# Patient Record
Sex: MALE | Race: WHITE | HISPANIC OR LATINO | Employment: FULL TIME | URBAN - METROPOLITAN AREA
[De-identification: names, ages, dates, MRNs, and addresses within clinical notes are randomized per-mention and may not be internally consistent; named-entity substitution may affect disease eponyms.]

---

## 2019-08-29 PROBLEM — S62.632B OPEN DISPLACED FRACTURE OF DISTAL PHALANX OF RIGHT MIDDLE FINGER: Status: ACTIVE | Noted: 2019-08-29

## 2019-08-29 PROBLEM — S62.634B OPEN DISPLACED FRACTURE OF DISTAL PHALANX OF RIGHT RING FINGER: Status: ACTIVE | Noted: 2019-08-29

## 2024-05-15 ENCOUNTER — HOSPITAL ENCOUNTER (EMERGENCY)
Facility: HOSPITAL | Age: 29
Discharge: HOME | End: 2024-05-15
Attending: EMERGENCY MEDICINE
Payer: OTHER MISCELLANEOUS

## 2024-05-15 VITALS
DIASTOLIC BLOOD PRESSURE: 85 MMHG | HEIGHT: 68 IN | OXYGEN SATURATION: 99 % | BODY MASS INDEX: 25.76 KG/M2 | HEART RATE: 68 BPM | WEIGHT: 170 LBS | RESPIRATION RATE: 18 BRPM | SYSTOLIC BLOOD PRESSURE: 127 MMHG | TEMPERATURE: 98 F

## 2024-05-15 DIAGNOSIS — R07.89 CHEST WALL PAIN: ICD-10-CM

## 2024-05-15 DIAGNOSIS — S29.9XXA TRAUMA OF CHEST, INITIAL ENCOUNTER: Primary | ICD-10-CM

## 2024-05-15 DIAGNOSIS — T14.90XA INJURY: ICD-10-CM

## 2024-05-15 PROCEDURE — 99284 EMERGENCY DEPT VISIT MOD MDM: CPT | Mod: 25

## 2024-05-15 PROCEDURE — 93010 ELECTROCARDIOGRAM REPORT: CPT | Mod: ,,, | Performed by: INTERNAL MEDICINE

## 2024-05-15 PROCEDURE — 93005 ELECTROCARDIOGRAM TRACING: CPT | Performed by: INTERNAL MEDICINE

## 2024-05-15 PROCEDURE — 25000003 PHARM REV CODE 250: Performed by: NURSE PRACTITIONER

## 2024-05-15 RX ORDER — METHOCARBAMOL 500 MG/1
500 TABLET, FILM COATED ORAL
Status: COMPLETED | OUTPATIENT
Start: 2024-05-15 | End: 2024-05-15

## 2024-05-15 RX ORDER — METHOCARBAMOL 750 MG/1
750 TABLET, FILM COATED ORAL 3 TIMES DAILY PRN
Qty: 15 TABLET | Refills: 0 | Status: SHIPPED | OUTPATIENT
Start: 2024-05-15 | End: 2024-05-20

## 2024-05-15 RX ORDER — IBUPROFEN 600 MG/1
600 TABLET ORAL EVERY 6 HOURS PRN
Qty: 20 TABLET | Refills: 0 | Status: SHIPPED | OUTPATIENT
Start: 2024-05-15 | End: 2024-05-20

## 2024-05-15 RX ADMIN — METHOCARBAMOL 500 MG: 500 TABLET ORAL at 04:05

## 2024-05-15 RX ADMIN — IBUPROFEN 600 MG: 400 TABLET ORAL at 04:05

## 2024-05-15 NOTE — FIRST PROVIDER EVALUATION
Emergency Department TeleTriage Encounter Note      CHIEF COMPLAINT    Chief Complaint   Patient presents with    Chest Injury     States machine at work hit him in the chest, seen at urgent care and sent for CT       VITAL SIGNS   Initial Vitals [05/15/24 1152]   BP Pulse Resp Temp SpO2   136/81 (!) 53 20 98.3 °F (36.8 °C) 100 %      MAP       --            ALLERGIES    Review of patient's allergies indicates:  No Known Allergies    PROVIDER TRIAGE NOTE  29 year old male presents to the ER with compalints of anterior central chest wall pain radiating into mid throacic central back since this morning after a pipe hit him in his chest while at work. Went to [place] urgent care for initial evaluation and was told he needed CT imaging and ER referral. Pt denies SOB. No abdominal pain. No lightheadedness. Reports no brusing or wound seen on chest.       AAOx3, respirations even and non- labored, stable vitals, normal coloration of skin, sitting upright in triage chair, appears in no acute distress.          ORDERS  Labs Reviewed - No data to display    ED Orders (720h ago, onward)      None              Virtual Visit Note: The provider triage portion of this emergency department evaluation and documentation was performed via [employer], a HIPAA-compliant telemedicine application, in concert with a tele-presenter in the room. A face to face patient evaluation with one of my colleagues will occur once the patient is placed in an emergency department room.      DISCLAIMER: This note was prepared with [employer] voice recognition transcription software. Garbled syntax, mangled pronouns, and other bizarre constructions may be attributed to that software system.

## 2024-05-15 NOTE — DISCHARGE INSTRUCTIONS
You were seen and evaluated in the ER today.  Your x-ray shows no fractures or dislocations.  You probably have a chest wall contusion.  You can rotate Tylenol and ibuprofen as needed for pain.  We have prescribed you muscle relaxers for muscular pain.  You can use heating pad for pain.  Maintain movement and stretches.  Please follow-up with your PCP as needed.  Please return to the ED for any worsening symptoms such as uncontrolled pain.      Our goal in the emergency department is to always give you outstanding care and exceptional service. You may receive a survey by mail or e-mail in the next week regarding your experience in our ED. We would greatly appreciate your completing and returning the survey. Your feedback provides us with a way to recognize our staff who give very good care and it helps us learn how to improve when your experience was below our aspiration of excellence.

## 2024-05-15 NOTE — ED PROVIDER NOTES
Source of History:  Patient, chart    Chief complaint:  Chest Injury (States machine at work hit him in the chest, seen at urgent care and sent for CT)      HPI:  Russell Ruth is a 29 y.o. male presenting for evaluation after blunt chest trauma.  Patient states he works for the [employer].  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  Patient states that he was holding onto a pipe attempting to bracket it to a another pipe when the vehicle that was next to him started moving forward.  Patient states that another co-worker pulled him out of the way the vehicle and he got hit in the mid upper chest by a large pipe.  Patient denies any head injury.  Patient denies any chest pain or shortness of breath.  Patient was seen at urgent care and sent to the ED for evaluation due to blunt chest trauma      This is the extent to the patients complaints today here in the emergency department.    ROS: As per HPI and below:  Constitutional: No fever.  No chills.  Eyes: No visual changes.   ENT: No sore throat. No ear pain.  Urinary: No abnormal urination.  MSK:  Positive for chest wall pain  Integument: No rashes or lesions.    Review of patient's allergies indicates:  No Known Allergies    PMH:  As per HPI and below:  No past medical history on file.  Past Surgical History:   Procedure Laterality Date    OPEN REDUCTION AND INTERNAL FIXATION (ORIF) OF INJURY OF FINGER Right 8/30/2019    Procedure: ORIF, FINGER;  Surgeon: Israel Pabon MD;  Location: Pending sale to [facility];  Service: Orthopedics;  Laterality: Right;  Right ring and long finger    PERCUTANEOUS PINNING OF FINGER Right 8/30/2019    Procedure: PINNING, FINGER, PERCUTANEOUS;  Surgeon: Israel Pabon MD;  Location: Pending sale to [facility];  Service: Orthopedics;  Laterality: Right;  Right ring and long finger       Social History     Tobacco Use    Smoking status: Former    Smokeless tobacco: Never   Substance Use Topics    Alcohol use: Not Currently    Drug  "use: Not Currently       Physical Exam:    /85   Pulse 68   Temp 98 °F (36.7 °C)   Resp 18   Ht 5' 8" (1.727 m)   Wt 77.1 kg (170 lb)   SpO2 99%   BMI 25.85 kg/m²   Nursing note and vital signs reviewed.  Constitutional: No acute distress.  Nontoxic  Head:  Normocephalic atraumatic  Eyes: No conjunctival injection.  Extraocular muscles are intact.  ENT: Normal phonation.  Musculoskeletal/Skin: Mild tenderness to palpation below clavicles mid sternal region.  No bruising or ecchymosis noted.  No crepitus noted.  Good turgor.  No abrasions.   Psych: Appropriate, conversant.    [facility]    Emergent evaluation of a 30 yo male presenting for evaluation after a blunt chest trauma.  Patient denies any chest pain or difficulty breathing.  Patient was seen at urgent care and sent to the ED for evaluation due to mechanism of injury.  On exam pt is A&Ox3. VSS. Nonfebrile and nontoxic appearing.  Mucous membranes pink and moist. Mild tenderness to palpation below clavicles mid sternal region.  No bruising or ecchymosis noted.  No crepitus noted.  Good turgor.  No abrasions.  Breath sounds clear bilaterally.  Abdomen soft and nontender. No rebound or guarding appreciated on exam.   BS WNL.  Pt speaking in full sentences.  Steady gait appreciated. Cap refill < 3 seconds.      History Acquisition   Additional history was acquired from other historians.  Chart    The patient's list of active medical problems, social history, medications, and allergies as documented per RN staff has been reviewed.     Differential Diagnoses   Based on available information and the initial assessment, the working differential diagnoses considered during this evaluation include but are not limited to contusion, abrasion, chest wall injury, costochondritis, blunt chest trauma, cardiac contusion, others.    I will get imaging, medicate and reassess.  I discussed case with my supervising physician.      LABS   Labs Reviewed - No data to " display      Imaging     Imaging Results              X-Ray Chest PA And Lateral (Final result)  Result time 05/15/24 12:43:37      Final result by Farheen Oliveira IV, MD (05/15/24 12:43:37)                   Impression:      No acute cardiopulmonary disease.      Electronically signed by: Farheen Oliveira  Date:    05/15/2024  Time:    12:43               Narrative:    EXAMINATION:  XR CHEST PA AND LATERAL    CLINICAL HISTORY:  Injury, unspecified, initial encounter    COMPARISON:  08/29/2019.    FINDINGS:  The heart and mediastinum appear unremarkable. There is no acute pulmonary vascular engorgement. The lungs are clear with no infiltrate, volume loss or pleural fluid accumulation observed.                                      A radiology report was available for my review at the time of the encounter.    EKG   EKG Readings: (Independently Interpreted)   Initial Reading: No STEMI. Rhythm: Sinus Bradycardia. Heart Rate: 57. Axis: Right Axis Deviation.   My independent interpretation    No STEMI  Sinus bradycardia  Rate of 57  Rightward axis       Additional Consideration   All available testing was considered during the course of this workup.  Comorbidities taken into consideration during the patient's evaluation and treatment include weight, age.    Social determinants of health were taken into consideration during development of our treatment plan.    Medications   methocarbamoL tablet 500 mg (500 mg Oral Given 5/15/24 1650)   ibuprofen tablet 600 mg (600 mg Oral Given 5/15/24 1650)      ED Course as of 05/15/24 1727   Wed May 15, 2024   1645 Chest x-ray independently reviewed by myself and Radiology.  Negative for any acute abnormalities.  Patient reassessed.  Patient has no bruising noted to his chest wall.  No crepitus noted.  No sign of cardiac contusion.  Patient advised to rotate Tylenol ibuprofen as needed for pain.  Robaxin for muscle strain.  Strict return to ED precautions discussed.  Patient verbalized  understanding of this plan of care.  All questions and concerns addressed. [RZ]    Patient is hemodynamically stable, vital signs are normal. Discharge instructions given. Return to ED precautions discussed. Follow up as directed. Pt verbalized understanding of this plan.  Pt is stable for discharge.[RZ]      ED Course User Index  [RZ] Ana Quinn NP             CLINICAL IMPRESSION  1. Trauma of chest, initial encounter    2. Injury    3. Chest wall pain         ED Disposition Condition    Discharge Stable            Instruction:  I see no indication of an emergent process beyond that addressed during our encounter but have duly counseled the patient/family regarding the need for prompt follow-up as well as the indications that should prompt immediate return to the emergency room should new or worrisome developments occur.  The patient/family has been provided with verbal and printed direction regarding our final diagnosis(es) as well as instructions regarding use of OTC and/or Rx medications intended to manage the patient's aforementioned conditions including:  ED Prescriptions       Medication Sig Dispense Start Date End Date Auth. Provider    methocarbamoL (ROBAXIN) 750 MG Tab Take 1 tablet (750 mg total) by mouth 3 (three) times daily as needed (muscle strain). 15 tablet 5/15/2024 5/20/2024 Ana Quinn NP    ibuprofen (ADVIL,MOTRIN) 600 MG tablet Take 1 tablet (600 mg total) by mouth every 6 (six) hours as needed for Pain. 20 tablet 5/15/2024 5/20/2024 Ana Quinn, JOSE          Patient has been advised of following recommended follow-up instructions:  Follow-up Information       Follow up With Specialties Details Why Contact Info    PCP  Schedule an appointment as soon as possible for a visit  As needed           The patient/family communicates understanding of all this information and all remaining questions and concerns were addressed at this time.      The patient's condition did not warrant  review of the  and prescription of controlled substances.      This note was created using dictation software.  This program may occasionally mistype words and phrases.         Ana Quinn, NP  05/15/24 4606

## 2024-06-08 LAB
OHS QRS DURATION: 82 MS
OHS QTC CALCULATION: 389 MS